# Patient Record
Sex: MALE | Race: WHITE | NOT HISPANIC OR LATINO | Employment: FULL TIME | URBAN - METROPOLITAN AREA
[De-identification: names, ages, dates, MRNs, and addresses within clinical notes are randomized per-mention and may not be internally consistent; named-entity substitution may affect disease eponyms.]

---

## 2024-05-01 ENCOUNTER — HOSPITAL ENCOUNTER (EMERGENCY)
Facility: HOSPITAL | Age: 44
Discharge: HOME | End: 2024-05-01
Attending: EMERGENCY MEDICINE
Payer: COMMERCIAL

## 2024-05-01 VITALS
TEMPERATURE: 98 F | WEIGHT: 185 LBS | HEIGHT: 70 IN | SYSTOLIC BLOOD PRESSURE: 173 MMHG | OXYGEN SATURATION: 98 % | RESPIRATION RATE: 14 BRPM | BODY MASS INDEX: 26.48 KG/M2 | DIASTOLIC BLOOD PRESSURE: 100 MMHG | HEART RATE: 61 BPM

## 2024-05-01 DIAGNOSIS — H66.91 ACUTE RIGHT OTITIS MEDIA: Primary | ICD-10-CM

## 2024-05-01 PROCEDURE — 99283 EMERGENCY DEPT VISIT LOW MDM: CPT | Performed by: EMERGENCY MEDICINE

## 2024-05-01 PROCEDURE — 99283 EMERGENCY DEPT VISIT LOW MDM: CPT

## 2024-05-01 RX ORDER — AMOXICILLIN 875 MG/1
875 TABLET, COATED ORAL 2 TIMES DAILY
Qty: 14 TABLET | Refills: 0 | Status: SHIPPED | OUTPATIENT
Start: 2024-05-01 | End: 2024-05-08

## 2024-05-01 RX ORDER — IBUPROFEN 600 MG/1
600 TABLET ORAL ONCE
Status: COMPLETED | OUTPATIENT
Start: 2024-05-01 | End: 2024-05-01

## 2024-05-01 RX ORDER — IBUPROFEN 600 MG/1
600 TABLET ORAL EVERY 8 HOURS PRN
Qty: 15 TABLET | Refills: 0 | Status: SHIPPED | OUTPATIENT
Start: 2024-05-01

## 2024-05-01 RX ADMIN — IBUPROFEN 600 MG: 600 TABLET, FILM COATED ORAL at 03:13

## 2024-05-01 NOTE — ED NOTES
"Patient states he has been \"dealing with sinus congestion\" and a right ear pain woke him from his sleep.   "

## 2024-05-01 NOTE — FSED PROVIDER NOTE
Subjective   History of Present Illness  43 yom complains of ear pain that woke him from sleep. The patient denies injury. He notes he has had some increased congestion and drainage for the past few days. He denies fever or sick contacts. He woke up tonight with ear pain. He has not taken anything for the pain PTA.       Review of Systems   Constitutional: Negative.    HENT:  Positive for ear pain, rhinorrhea and sore throat.    All other systems reviewed and are negative.      History reviewed. No pertinent past medical history.    No Known Allergies    History reviewed. No pertinent surgical history.    History reviewed. No pertinent family history.    Social History     Socioeconomic History    Marital status:            Objective   Physical Exam  Vitals reviewed.   Constitutional:       Appearance: Normal appearance.   HENT:      Head: Normocephalic and atraumatic.      Right Ear: No middle ear effusion. No mastoid tenderness. Tympanic membrane is injected and erythematous. Tympanic membrane is not perforated.      Left Ear: Tympanic membrane, ear canal and external ear normal.      Mouth/Throat:      Mouth: Mucous membranes are moist.      Pharynx: Oropharynx is clear. Posterior oropharyngeal erythema present. No oropharyngeal exudate.   Eyes:      Extraocular Movements: Extraocular movements intact.      Pupils: Pupils are equal, round, and reactive to light.   Cardiovascular:      Rate and Rhythm: Normal rate and regular rhythm.      Pulses: Normal pulses.      Heart sounds: Normal heart sounds.   Pulmonary:      Effort: Pulmonary effort is normal.      Breath sounds: Normal breath sounds.   Skin:     General: Skin is warm and dry.   Neurological:      Mental Status: He is alert.         Procedures           ED Course                                           Medical Decision Making  Well-appearing patient with symptoms/signs consistent with acute otitis media. No evidence of mastoiditis, sinusitis and  patient at baseline mental status making intracranial abscess, meningitis, or other intracranial process unlikely. Symptoms are also not consistent with more concerning sepsis or focal bacterial infection. Patient is tolerating POs and able to take medications as an outpatient.  Given strict return precautions and agreed with assessment and plan. Antibiotics Amoxicillin    Risk  Prescription drug management.        Final diagnoses:   Acute right otitis media       ED Disposition  ED Disposition       ED Disposition   Discharge    Condition   Stable    Comment   --               PATIENT CONNECTION - [facility] [address]  [phone]  Schedule an appointment as soon as possible for a visit            Medication List        New Prescriptions      amoxicillin 875 MG tablet  Commonly known as: AMOXIL  Take 1 tablet by mouth 2 (Two) Times a Day for 7 days.     ibuprofen 600 MG tablet  Commonly known as: ADVIL,MOTRIN  Take 1 tablet by mouth Every 8 (Eight) Hours As Needed (pain).               Where to Get Your Medications        These medications were sent to [facility]/pharmacy #[id number] - [place], [place] - [address] [place] [address] - [phone]  - [phone] [address] [place] [address]      Hours: 24-hours Phone: [phone]   amoxicillin 875 MG tablet  ibuprofen 600 MG tablet

## 2024-05-01 NOTE — Clinical Note
[facility] FSED [place] [address] [place] [address] [place] [address]  Phone: [phone]    Sal Barragan was seen and treated in our emergency department on 5/1/2024.  He may return to work on 05/02/2024.         Thank you for choosing [facility].    Debora Buckner MD

## 2025-03-26 ENCOUNTER — APPOINTMENT (OUTPATIENT)
Dept: ULTRASOUND IMAGING | Facility: HOSPITAL | Age: 45
End: 2025-03-26
Payer: COMMERCIAL

## 2025-03-26 ENCOUNTER — HOSPITAL ENCOUNTER (EMERGENCY)
Facility: HOSPITAL | Age: 45
Discharge: HOME | End: 2025-03-26
Attending: EMERGENCY MEDICINE
Payer: COMMERCIAL

## 2025-03-26 VITALS
HEIGHT: 69 IN | HEART RATE: 76 BPM | TEMPERATURE: 98.7 F | BODY MASS INDEX: 29.37 KG/M2 | OXYGEN SATURATION: 97 % | SYSTOLIC BLOOD PRESSURE: 163 MMHG | DIASTOLIC BLOOD PRESSURE: 95 MMHG | WEIGHT: 198.3 LBS | RESPIRATION RATE: 18 BRPM

## 2025-03-26 DIAGNOSIS — L03.116 CELLULITIS OF LEFT LOWER EXTREMITY: Primary | ICD-10-CM

## 2025-03-26 PROCEDURE — 93971 EXTREMITY STUDY: CPT

## 2025-03-26 PROCEDURE — 99283 EMERGENCY DEPT VISIT LOW MDM: CPT | Performed by: EMERGENCY MEDICINE

## 2025-03-26 PROCEDURE — 99284 EMERGENCY DEPT VISIT MOD MDM: CPT

## 2025-03-26 RX ORDER — SULFAMETHOXAZOLE AND TRIMETHOPRIM 800; 160 MG/1; MG/1
1 TABLET ORAL 2 TIMES DAILY
Qty: 14 TABLET | Refills: 0 | Status: SHIPPED | OUTPATIENT
Start: 2025-03-26

## 2025-03-26 NOTE — Clinical Note
[facility] [place] [address] [address] [place] [address] [place] [address]  Phone: [phone]    Sal Barragan was seen and treated in our emergency department on 3/26/2025.  He may return to work on 03/31/2025.         Thank you for choosing [facility].    Edu Parish MD

## 2025-03-26 NOTE — Clinical Note
[facility] [place] [address] [address] [place] [address] [place] [address]  Phone: [phone]    Sal Barragan was seen and treated in our emergency department on 3/26/2025.  He may return to work on 03/27/2025.         Thank you for choosing [facility].    Edu Parish MD

## 2025-03-26 NOTE — FSED PROVIDER NOTE
Subjective   History of Present Illness  This is a 44-year-old male who presents for evaluation.  He states that he has had pain and swelling in his left leg.  He states that he noticed an area of redness over the medial distal left thigh.  No known history of trauma.  No fever or chills.  No nausea or vomiting.  No shortness of breath.        Review of Systems   Constitutional:  Negative for fever.   Musculoskeletal:  Positive for myalgias.   Skin:  Positive for color change.       No past medical history on file.    No Known Allergies    No past surgical history on file.    No family history on file.    Social History     Socioeconomic History    Marital status:            Objective   Physical Exam  Vitals and nursing note reviewed.   Constitutional:       General: He is not in acute distress.  Musculoskeletal:      Comments: No calf tenderness.  No palpable cords.   Lymphadenopathy:      Lower Body: Left inguinal adenopathy present.   Skin:     Comments: Small area of linear erythema present over the left distal medial thigh.  She is tender over this area.  There is an area of callus over the left tibial plateau region.  There is what appears to be an abrasion present over the lateral joint line of the left knee.   Neurological:      Mental Status: He is alert.         Procedures           ED Course  ED Course as of 03/26/25 1059   Wed Mar 26, 2025   1015 The patient was seen and examined.  Patient most likely has early lymphangitis, cellulitis of the leg.  Patient is concerned of DVT.  Doppler ultrasound will be obtained. [BW]   1058 Doppler ultrasound demonstrates no evidence of DVT. [BW]      ED Course User Index  [BW] Edu Parish MD                                           Medical Decision Making      Final diagnoses:   Cellulitis of left lower extremity       ED Disposition  ED Disposition       ED Disposition   Discharge    Condition   Stable    Comment   --               PATIENT CONNECTION -  [facility] [address]  [phone]  Call            Medication List        New Prescriptions      sulfamethoxazole-trimethoprim 800-160 MG per tablet  Commonly known as: BACTRIM DS,SEPTRA DS  Take 1 tablet by mouth 2 (Two) Times a Day.               Where to Get Your Medications        These medications were sent to "[facility][id number] - [facility] [place] [address] AT [facility] & [place] [address] - [phone] [facility] [phone] [address] [place] [address]      Phone: [phone]   sulfamethoxazole-trimethoprim 800-160 MG per tablet

## 2025-03-26 NOTE — DISCHARGE INSTRUCTIONS
Bactrim DS-1 tablet twice daily  [place] with soapy water daily  Follow-up with primary care  Return if increased redness, swelling, fever, or worsened symptoms

## 2025-03-26 NOTE — Clinical Note
[facility] [place] [address] [address] [place] [address] [place] [address]  Phone: [phone]    Sal Barragan was seen and treated in our emergency department on 3/26/2025.  He may return to work on 03/27/2025.         Thank you for choosing [facility].    Edu Parish MD

## 2025-03-26 NOTE — Clinical Note
[facility] [place] [address] [address] [place] [address] [place] [address]  Phone: [phone]    Sal Barragan was seen and treated in our emergency department on 3/26/2025.  He may return to work on 03/31/2025.         Thank you for choosing [facility].    Edu Parish MD